# Patient Record
Sex: MALE | Race: WHITE | NOT HISPANIC OR LATINO | Employment: OTHER | URBAN - METROPOLITAN AREA
[De-identification: names, ages, dates, MRNs, and addresses within clinical notes are randomized per-mention and may not be internally consistent; named-entity substitution may affect disease eponyms.]

---

## 2023-10-25 ENCOUNTER — LAB VISIT (OUTPATIENT)
Dept: LAB | Facility: HOSPITAL | Age: 58
End: 2023-10-25
Attending: SPECIALIST
Payer: COMMERCIAL

## 2023-10-25 ENCOUNTER — OFFICE VISIT (OUTPATIENT)
Dept: RADIATION ONCOLOGY | Facility: CLINIC | Age: 58
End: 2023-10-25
Payer: COMMERCIAL

## 2023-10-25 VITALS
HEIGHT: 69 IN | WEIGHT: 248 LBS | TEMPERATURE: 97 F | OXYGEN SATURATION: 97 % | HEART RATE: 61 BPM | RESPIRATION RATE: 18 BRPM | BODY MASS INDEX: 36.73 KG/M2 | SYSTOLIC BLOOD PRESSURE: 139 MMHG | DIASTOLIC BLOOD PRESSURE: 82 MMHG

## 2023-10-25 DIAGNOSIS — C61 ADENOCARCINOMA OF PROSTATE: ICD-10-CM

## 2023-10-25 DIAGNOSIS — C61 ADENOCARCINOMA OF PROSTATE: Primary | ICD-10-CM

## 2023-10-25 PROCEDURE — 3044F HG A1C LEVEL LT 7.0%: CPT | Mod: CPTII,S$GLB,, | Performed by: SPECIALIST

## 2023-10-25 PROCEDURE — 3044F PR MOST RECENT HEMOGLOBIN A1C LEVEL <7.0%: ICD-10-PCS | Mod: CPTII,S$GLB,, | Performed by: SPECIALIST

## 2023-10-25 PROCEDURE — 1159F MED LIST DOCD IN RCRD: CPT | Mod: CPTII,S$GLB,, | Performed by: SPECIALIST

## 2023-10-25 PROCEDURE — 1159F PR MEDICATION LIST DOCUMENTED IN MEDICAL RECORD: ICD-10-PCS | Mod: CPTII,S$GLB,, | Performed by: SPECIALIST

## 2023-10-25 PROCEDURE — 3060F POS MICROALBUMINURIA REV: CPT | Mod: CPTII,S$GLB,, | Performed by: SPECIALIST

## 2023-10-25 PROCEDURE — 3066F NEPHROPATHY DOC TX: CPT | Mod: CPTII,S$GLB,, | Performed by: SPECIALIST

## 2023-10-25 PROCEDURE — 3060F PR POS MICROALBUMINURIA RESULT DOCUMENTED/REVIEW: ICD-10-PCS | Mod: CPTII,S$GLB,, | Performed by: SPECIALIST

## 2023-10-25 PROCEDURE — 3075F SYST BP GE 130 - 139MM HG: CPT | Mod: CPTII,S$GLB,, | Performed by: SPECIALIST

## 2023-10-25 PROCEDURE — 3008F BODY MASS INDEX DOCD: CPT | Mod: CPTII,S$GLB,, | Performed by: SPECIALIST

## 2023-10-25 PROCEDURE — 99999 PR PBB SHADOW E&M-NEW PATIENT-LVL IV: ICD-10-PCS | Mod: PBBFAC,,,

## 2023-10-25 PROCEDURE — 99213 PR OFFICE/OUTPT VISIT, EST, LEVL III, 20-29 MIN: ICD-10-PCS | Mod: S$GLB,,, | Performed by: SPECIALIST

## 2023-10-25 PROCEDURE — 3075F PR MOST RECENT SYSTOLIC BLOOD PRESS GE 130-139MM HG: ICD-10-PCS | Mod: CPTII,S$GLB,, | Performed by: SPECIALIST

## 2023-10-25 PROCEDURE — 3066F PR DOCUMENTATION OF TREATMENT FOR NEPHROPATHY: ICD-10-PCS | Mod: CPTII,S$GLB,, | Performed by: SPECIALIST

## 2023-10-25 PROCEDURE — 84153 ASSAY OF PSA TOTAL: CPT | Performed by: SPECIALIST

## 2023-10-25 PROCEDURE — 3008F PR BODY MASS INDEX (BMI) DOCUMENTED: ICD-10-PCS | Mod: CPTII,S$GLB,, | Performed by: SPECIALIST

## 2023-10-25 PROCEDURE — 99213 OFFICE O/P EST LOW 20 MIN: CPT | Mod: S$GLB,,, | Performed by: SPECIALIST

## 2023-10-25 PROCEDURE — 3079F PR MOST RECENT DIASTOLIC BLOOD PRESSURE 80-89 MM HG: ICD-10-PCS | Mod: CPTII,S$GLB,, | Performed by: SPECIALIST

## 2023-10-25 PROCEDURE — 99999 PR PBB SHADOW E&M-NEW PATIENT-LVL IV: CPT | Mod: PBBFAC,,,

## 2023-10-25 PROCEDURE — 36415 COLL VENOUS BLD VENIPUNCTURE: CPT | Performed by: SPECIALIST

## 2023-10-25 PROCEDURE — 3079F DIAST BP 80-89 MM HG: CPT | Mod: CPTII,S$GLB,, | Performed by: SPECIALIST

## 2023-10-25 RX ORDER — ROSUVASTATIN CALCIUM 20 MG/1
20 TABLET, COATED ORAL
COMMUNITY

## 2023-10-25 RX ORDER — GLIMEPIRIDE 4 MG/1
4 TABLET ORAL
COMMUNITY
Start: 2023-10-13

## 2023-10-25 RX ORDER — CLOPIDOGREL BISULFATE 75 MG/1
75 TABLET ORAL
COMMUNITY

## 2023-10-25 RX ORDER — MULTIVITAMIN
1 TABLET ORAL
COMMUNITY

## 2023-10-25 RX ORDER — ASPIRIN 81 MG/1
1 TABLET ORAL EVERY MORNING
COMMUNITY

## 2023-10-25 RX ORDER — METOPROLOL SUCCINATE 25 MG/1
25 TABLET, EXTENDED RELEASE ORAL
COMMUNITY

## 2023-10-25 RX ORDER — TAMSULOSIN HYDROCHLORIDE 0.4 MG/1
0.8 CAPSULE ORAL
COMMUNITY
Start: 2023-08-14

## 2023-10-25 RX ORDER — TRIAMTERENE AND HYDROCHLOROTHIAZIDE 37.5; 25 MG/1; MG/1
CAPSULE ORAL
COMMUNITY

## 2023-10-25 RX ORDER — IBUPROFEN 100 MG/5ML
1000 SUSPENSION, ORAL (FINAL DOSE FORM) ORAL
COMMUNITY

## 2023-10-25 RX ORDER — SEMAGLUTIDE 2.68 MG/ML
2 INJECTION, SOLUTION SUBCUTANEOUS
COMMUNITY
Start: 2023-10-13 | End: 2023-11-10

## 2023-10-25 RX ORDER — METFORMIN HYDROCHLORIDE 500 MG/1
500 TABLET ORAL
COMMUNITY

## 2023-10-25 RX ORDER — ALBUTEROL SULFATE 90 UG/1
AEROSOL, METERED RESPIRATORY (INHALATION)
COMMUNITY
Start: 2023-09-05

## 2023-10-25 NOTE — PROGRESS NOTES
RADIATION ONCOLOGY FOLLOW UP NOTE    HISTORY OF PRESENT ILLNESS:  C61: 54-year-old man with a host of medical problems, non immediately life-threatening, including a new diagnosis of high-risk prostate cancer with a PSA of 5.8 in a 24.6 cc gland, with a prominent MRI lesion in the right base with no evidence of JOHNATHON.  7/12 sampling cores contained disease with a Brualio score as high as 5+4.  He presented with good urinary function on no medication.  Endocrine therapy was 1st delivered on 07/21/2020 and he underwent volume study and SpaceOAR placement on 10/07/2020, followed by external beam radiotherapy with a seed boost on 12/30/2020.  He completed 18 months of endocrine therapy in January 2022 01/25/2022 0.02 testosterone 5  07/01/2022 0.08  09/28/2022 0.19 testosterone 379  04/03/2023 0.21  07/17/2023 0.17    INTERVAL HISTORY:  He returns for routine six-month follow-up.  He describes stable urinary function with Flomax ongoing, 2 tabs each morning.  He has nocturia once or sometimes twice and evening and denies weak stream, hesitancy, hematuria, dysuria, daytime urgency, daytime frequency less than 2 hours, or incontinence.  He has no new bone pain or GI complaints.  He has persistent diminished libido and erectile function compared to baseline, with significant interval recovery.  He is not sexually active    After modest rise over time, likely related to testosterone recovery, his most recent value in July was declining as shown above      PHYSICAL EXAMINATION:  Vitals:    10/25/23 0802   BP: 139/82   Pulse: 61   Resp: 18   Temp: 97.4 °F (36.3 °C)     PHYSICAL EXAM:    General:  Scales exam table without assitance, A&O x4, NAD  HEENT:  PEERLA, CN II-XII intact, EOM intact,   Lymphatics:  no cervical/sclav LAD  Lungs:  CTAB  Heart:  RRR  Abdomen:  NTND +BS, no HSM      ASSESSMENT:  Clinically and biochemically CAROL      PLAN:  PSA will be drawn today.  Follow up in 6 months.  He sees Dr. Gay in 3 months  where PSA and testosterone have been ordered

## 2023-10-26 LAB — COMPLEXED PSA SERPL-MCNC: 0.15 NG/ML (ref 0–4)

## 2024-04-25 ENCOUNTER — OFFICE VISIT (OUTPATIENT)
Dept: RADIATION ONCOLOGY | Facility: CLINIC | Age: 59
End: 2024-04-25
Payer: COMMERCIAL

## 2024-04-25 ENCOUNTER — LAB VISIT (OUTPATIENT)
Dept: LAB | Facility: HOSPITAL | Age: 59
End: 2024-04-25
Attending: INTERNAL MEDICINE
Payer: COMMERCIAL

## 2024-04-25 VITALS
OXYGEN SATURATION: 96 % | RESPIRATION RATE: 18 BRPM | WEIGHT: 245.13 LBS | TEMPERATURE: 97 F | HEART RATE: 55 BPM | SYSTOLIC BLOOD PRESSURE: 122 MMHG | BODY MASS INDEX: 36.31 KG/M2 | DIASTOLIC BLOOD PRESSURE: 72 MMHG | HEIGHT: 69 IN

## 2024-04-25 DIAGNOSIS — C61 ADENOCARCINOMA OF PROSTATE: ICD-10-CM

## 2024-04-25 DIAGNOSIS — C61 ADENOCARCINOMA OF PROSTATE: Primary | ICD-10-CM

## 2024-04-25 LAB — COMPLEXED PSA SERPL-MCNC: 0.15 NG/ML (ref 0–4)

## 2024-04-25 PROCEDURE — 3074F SYST BP LT 130 MM HG: CPT | Mod: CPTII,S$GLB,, | Performed by: SPECIALIST

## 2024-04-25 PROCEDURE — 3008F BODY MASS INDEX DOCD: CPT | Mod: CPTII,S$GLB,, | Performed by: SPECIALIST

## 2024-04-25 PROCEDURE — 99213 OFFICE O/P EST LOW 20 MIN: CPT | Mod: S$GLB,,, | Performed by: SPECIALIST

## 2024-04-25 PROCEDURE — 99999 PR PBB SHADOW E&M-EST. PATIENT-LVL IV: CPT | Mod: PBBFAC,,, | Performed by: SPECIALIST

## 2024-04-25 PROCEDURE — 1159F MED LIST DOCD IN RCRD: CPT | Mod: CPTII,S$GLB,, | Performed by: SPECIALIST

## 2024-04-25 PROCEDURE — 3078F DIAST BP <80 MM HG: CPT | Mod: CPTII,S$GLB,, | Performed by: SPECIALIST

## 2024-04-25 PROCEDURE — 84153 ASSAY OF PSA TOTAL: CPT | Performed by: SPECIALIST

## 2024-04-25 PROCEDURE — 36415 COLL VENOUS BLD VENIPUNCTURE: CPT | Performed by: SPECIALIST

## 2024-04-25 RX ORDER — SODIUM PICOSULFATE, MAGNESIUM OXIDE, AND ANHYDROUS CITRIC ACID 12; 3.5; 1 G/175ML; G/175ML; MG/175ML
LIQUID ORAL
COMMUNITY
Start: 2024-04-09

## 2024-04-25 RX ORDER — TIRZEPATIDE 7.5 MG/.5ML
7.5 INJECTION, SOLUTION SUBCUTANEOUS
COMMUNITY
Start: 2024-04-08

## 2024-04-25 NOTE — PROGRESS NOTES
"[facility] / [facility] - Radiation Oncology Follow Up Note    Requesting physician: Rishi Gay MD    HISTORY OF PRESENT ILLNESS: C61: 54-year-old man with a host of medical problems, non immediately life-threatening, including a new diagnosis of high-risk prostate cancer with a PSA of 5.8 in a 24.6 cc gland, with a prominent MRI lesion in the right base with no evidence of JOHNATHON.  7/12 sampling cores contained disease with a Braulio score as high as 5+4.  He presented with good urinary function on no medication.  Endocrine therapy was 1st delivered on 07/21/2020 and he underwent volume study and SpaceOAR placement on 10/07/2020, followed by external beam radiotherapy with a seed boost on 12/30/2020.  He completed 18 months of endocrine therapy in January 2022 01/25/2022 0.02 testosterone 5  07/01/2022 0.08  09/28/2022 0.19 testosterone 379  04/03/2023 0.21  07/17/2023 0.17  10/25/2023 0.15      INTERVAL HISTORY:  He continues to tabs of Flomax q.a.m. with continued near perfect function including nocturia once or twice per evening and denying weak stream, hesitancy, hematuria, dysuria, daytime urgency, daytime frequency less than 2 hours, or incontinence.  He has no new bone pain or GI complaints.  He reports near-complete ED in his not active.  He denies other interval change or complaint    PHYSICAL EXAMINATION:  Vitals:    04/25/24 0801   BP: 122/72   Pulse: (!) 55   Resp: 18   Temp: 97.4 °F (36.3 °C)   SpO2: 96%   Weight: 111.2 kg (245 lb 2.4 oz)   Height: 5' 9" (1.753 m)      General:  A&O x4, NAD  HEENT:  PEERLA, CN II-XII intact, EOM intact,   Lymphatics:  no cervical/sclav LAD  Lungs:  CTAB  Heart:  RRR  Abdomen:  NTND +BS, no HSM      ASSESSMENT:  Clinically and biochemically CAROL      PLAN:  PSA today.  Follow up in 6 months.  He sees Dr. aGy in about 3 months        "

## 2024-10-23 ENCOUNTER — OFFICE VISIT (OUTPATIENT)
Dept: RADIATION ONCOLOGY | Facility: CLINIC | Age: 59
End: 2024-10-23
Payer: COMMERCIAL

## 2024-10-23 VITALS
TEMPERATURE: 98 F | DIASTOLIC BLOOD PRESSURE: 68 MMHG | HEIGHT: 69 IN | RESPIRATION RATE: 18 BRPM | HEART RATE: 65 BPM | OXYGEN SATURATION: 99 % | SYSTOLIC BLOOD PRESSURE: 114 MMHG | WEIGHT: 242.31 LBS | BODY MASS INDEX: 35.89 KG/M2

## 2024-10-23 DIAGNOSIS — C61 ADENOCARCINOMA OF PROSTATE: Primary | ICD-10-CM

## 2024-10-23 PROCEDURE — 99999 PR PBB SHADOW E&M-EST. PATIENT-LVL IV: CPT | Mod: PBBFAC,,, | Performed by: SPECIALIST

## 2024-10-23 NOTE — PROGRESS NOTES
"[facility] - Radiation Oncology Follow Up Note    Requesting physician: Rishi Gay MD     HISTORY OF PRESENT ILLNESS: C61: 54-year-old man with a host of medical problems, non immediately life-threatening, including a new diagnosis of high-risk prostate cancer with a PSA of 5.8 in a 24.6 cc gland, with a prominent MRI lesion in the right base with no evidence of JOHNATHON.  7/12 sampling cores contained disease with a Braulio score as high as 5+4.  He presented with good urinary function on no medication.  Endocrine therapy was 1st delivered on 07/21/2020 and he underwent volume study and SpaceOAR placement on 10/07/2020, followed by external beam radiotherapy with a seed boost on 12/30/2020.  He completed 18 months of endocrine therapy in January 2022 01/25/2022 0.02 testosterone 5  07/01/2022 0.08  09/28/2022 0.19 testosterone 379  04/03/2023 0.21  07/17/2023 0.17  10/25/2023 0.15  07/18/2024 0.14 Testosterone 369     INTERVAL HISTORY:  He returns for routine six-month follow up.  He has no new complaints related to therapy are worrisome for local regional or metastatic recurrence.  Continues morning Flomax with excellent urinary function, though he does have nocturia at least twice most evenings, often many more, mainly related to his late night fluid intake.  He does not find this bothersome.  He otherwise describes perfect function, denying weak stream, hesitancy, hematuria, dysuria, daytime urgency, daytime frequency less than 2 hours, or incontinence.  He has no new bone pain or GI complaints.  He has poor erectile function in his not interested in intervention.    PSA continues to fall as shown above, most recently drawn in Dr. Gay's office      PHYSICAL EXAMINATION:  Vitals:    10/23/24 0905   BP: 114/68   Pulse: 65   Resp: 18   Temp: 97.5 °F (36.4 °C)   SpO2: 99%   Weight: 109.9 kg (242 lb 4.6 oz)   Height: 5' 9" (1.753 m)      General:  A&O x4, NAD  HEENT:  " CIARA, CN II-XII intact, EOM intact,   Lymphatics:  no cervical/sclav LAD  Lungs:  CTAB  Heart:  RRR  Abdomen:  NTND +BS, no HSM      ASSESSMENT:  Clinically and biochemically CAROL with excellent urinary function on daily Flomax and near-complete ED, non interested in intervention      PLAN:  Follow up in 6 months.  He sees Dr. Gay with blood work in January

## 2025-04-24 ENCOUNTER — OFFICE VISIT (OUTPATIENT)
Dept: RADIATION ONCOLOGY | Facility: CLINIC | Age: 60
End: 2025-04-24
Payer: COMMERCIAL

## 2025-04-24 ENCOUNTER — LAB VISIT (OUTPATIENT)
Dept: LAB | Facility: HOSPITAL | Age: 60
End: 2025-04-24
Attending: INTERNAL MEDICINE
Payer: COMMERCIAL

## 2025-04-24 VITALS
DIASTOLIC BLOOD PRESSURE: 82 MMHG | HEART RATE: 71 BPM | OXYGEN SATURATION: 96 % | BODY MASS INDEX: 33.27 KG/M2 | TEMPERATURE: 98 F | HEIGHT: 69 IN | WEIGHT: 224.63 LBS | RESPIRATION RATE: 19 BRPM | SYSTOLIC BLOOD PRESSURE: 128 MMHG

## 2025-04-24 DIAGNOSIS — C34.90 MALIGNANT NEOPLASM OF UNSPECIFIED PART OF UNSPECIFIED BRONCHUS OR LUNG: ICD-10-CM

## 2025-04-24 DIAGNOSIS — C61 ADENOCARCINOMA OF PROSTATE: Primary | ICD-10-CM

## 2025-04-24 PROCEDURE — 99999 PR PBB SHADOW E&M-EST. PATIENT-LVL V: CPT | Mod: PBBFAC,,, | Performed by: SPECIALIST

## 2025-04-24 PROCEDURE — 84153 ASSAY OF PSA TOTAL: CPT

## 2025-04-24 PROCEDURE — 36415 COLL VENOUS BLD VENIPUNCTURE: CPT

## 2025-04-24 NOTE — PROGRESS NOTES
[facility] - Radiation Oncology Follow Up Note    Requesting physician: Rishi Gay MD     HISTORY OF PRESENT ILLNESS: C61: 54-year-old man with a host of medical problems, non immediately life-threatening, including a new diagnosis of high-risk prostate cancer with a PSA of 5.8 in a 24.6 cc gland, with a prominent MRI lesion in the right base with no evidence of JOHNATHON.  7/12 sampling cores contained disease with a Braulio score as high as 5+4.  He presented with good urinary function on no medication.  Endocrine therapy was 1st delivered on 07/21/2020 and he underwent volume study and SpaceOAR placement on 10/07/2020, followed by external beam radiotherapy with a seed boost on 12/30/2020.  He completed 18 months of endocrine therapy in January 2022 01/25/2022 0.02 testosterone 5  07/01/2022 0.08  09/28/2022 0.19 testosterone 379  04/03/2023 0.21  07/17/2023 0.17  10/25/2023 0.15  07/18/2024 0.14     Testosterone 369  02/13/2025 0.27    INTERVAL HISTORY:  Refilled Flomax on 04/07/2025, b.i.d.    PSA rise was noted on 02/13/2025 as shown above.  [facility] PET [facility] on 04/17/2025 by report shows no evidence of malignancy.  Incidental note of right lower lobe mass with moderate uptake in the right perihilar soft tissue of uncertain significance        Low-dose screening CT of the lung on 03/28/2025, which I have reviewed, shows a lung rads 4 B, very suspicious, large masslike opacity in the right posterior lower lobe measuring 6.8 cm, abutting the pleura.  There is bulky right infrahilar adenopathy and shotty mediastinal adenopathy.        MRI of the brain on 04/11/2025 was benign    Chest CT without contrast on 04/15/2025 showed an additional small nodule laterally in the right lower lobe and increased size of the previously noted right lower lobe pleural-based mass    He underwent bronchoscopy on 04/15/2025 recovering high-grade large cell neuroendocrine carcinoma from right  "upper lobe nodule.  Right lower lobe nodule was also positive with similar histology, as was station 7 and 10 R lymph node.  11 L, 4 L, and 4R biopsies were benign          PHYSICAL EXAMINATION:  Vitals:    04/24/25 1017   BP: 128/82   Pulse: 71   Resp: 19   Temp: 97.8 °F (36.6 °C)   SpO2: 96%   Weight: 101.9 kg (224 lb 10.4 oz)   Height: 5' 9" (1.753 m)      General:  A&O x4, NAD  HEENT:  PEERLA, CN II-XII intact, EOM intact,   Lymphatics:  no cervical/sclav LAD  Lungs:  CTAB  Heart:  RRR  Abdomen:  NTND +BS, no HSM  Pelvis: No inguinal adenopathy.  Normal phallus scrotal contents.  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  Follow up in 6 months.  He sees Dr. Gay in August    He has been referred to [facility], and to expedite completion of radiographic workup of his new lung cancer, I have scheduled a FDG PET scan for early next week.      Addendum:  PSA today= 0.16.  Prior rise likely lab error.  Follow up in 6 months        "

## 2025-04-25 ENCOUNTER — RESULTS FOLLOW-UP (OUTPATIENT)
Dept: RADIATION ONCOLOGY | Facility: CLINIC | Age: 60
End: 2025-04-25

## 2025-04-25 LAB — PSA SERPL-MCNC: 0.16 NG/ML

## 2025-04-30 ENCOUNTER — PATIENT MESSAGE (OUTPATIENT)
Dept: RADIATION ONCOLOGY | Facility: CLINIC | Age: 60
End: 2025-04-30
Payer: COMMERCIAL

## 2025-04-30 DIAGNOSIS — C34.31 PRIMARY CANCER OF RIGHT LOWER LOBE OF LUNG: Primary | ICD-10-CM

## 2025-04-30 DIAGNOSIS — C34.90 MALIGNANT NEOPLASM OF UNSPECIFIED PART OF UNSPECIFIED BRONCHUS OR LUNG: ICD-10-CM
